# Patient Record
Sex: FEMALE | Race: WHITE | URBAN - METROPOLITAN AREA
[De-identification: names, ages, dates, MRNs, and addresses within clinical notes are randomized per-mention and may not be internally consistent; named-entity substitution may affect disease eponyms.]

---

## 2023-10-12 DIAGNOSIS — C43.59 MALIGNANT MELANOMA OF UPPER BACK (HCC): Primary | ICD-10-CM

## 2023-10-16 ENCOUNTER — TELEPHONE (OUTPATIENT)
Dept: HEMATOLOGY ONCOLOGY | Facility: CLINIC | Age: 53
End: 2023-10-16

## 2023-10-16 NOTE — TELEPHONE ENCOUNTER
I called Rinku Tamez in response to a referral that was received for patient to establish care with Surgical Oncology. Outreach was made to schedule a consultation. I left a voicemail explaining the reason for my call and advised patient to call [facility] at [phone]. Another attempt will be made to contact patient.

## 2023-10-18 ENCOUNTER — TELEPHONE (OUTPATIENT)
Dept: HEMATOLOGY ONCOLOGY | Facility: CLINIC | Age: 53
End: 2023-10-18

## 2023-10-18 NOTE — TELEPHONE ENCOUNTER
I called Estevan Andujar in response to a referral that was received for patient to establish care with Surgical Oncology. Outreach was made to schedule a consultation. I left a voicemail explaining the reason for my call and advised patient to call [facility] at [phone]. Another attempt will be made to contact patient.

## 2023-10-19 ENCOUNTER — TELEPHONE (OUTPATIENT)
Dept: HEMATOLOGY ONCOLOGY | Facility: CLINIC | Age: 53
End: 2023-10-19

## 2023-10-19 NOTE — TELEPHONE ENCOUNTER
I called Stephanie Ornelas in response to a referral that was received for patient to establish care with Surgical Oncology. Outreach was made to schedule a consultation. I left a voicemail explaining the reason for my call and advised patient to call [facility] at [phone]. This is the third attempt to schedule patient unsuccessfully. The referral has been closed, a [employer] message has been sent to patient (if applicable) and a letter has been sent to the address on file.

## 2023-10-31 ENCOUNTER — TELEPHONE (OUTPATIENT)
Dept: SURGICAL ONCOLOGY | Facility: CLINIC | Age: 53
End: 2023-10-31

## 2023-10-31 PROBLEM — C43.59 MALIGNANT MELANOMA OF TORSO EXCLUDING BREAST (HCC): Status: ACTIVE | Noted: 2023-10-31

## 2023-11-01 ENCOUNTER — DOCUMENTATION (OUTPATIENT)
Dept: HEMATOLOGY ONCOLOGY | Facility: CLINIC | Age: 53
End: 2023-11-01

## 2023-11-01 NOTE — PROGRESS NOTES
Pathology slides received: 11/01/23  From [facility] via [employer]. Consulting: Dr. Kar Mims  11/14/23  Dx:C43.59 (ICD-10-CM) - Malignant melanoma of upper back     Forwarded via interoffice mail to Saul Colón in Pathology department for processing.

## 2023-11-07 ENCOUNTER — LAB REQUISITION (OUTPATIENT)
Dept: LAB | Facility: HOSPITAL | Age: 53
End: 2023-11-07
Payer: COMMERCIAL

## 2023-11-07 DIAGNOSIS — C43.59 MALIGNANT MELANOMA OF OTHER PART OF TRUNK (HCC): ICD-10-CM

## 2023-11-07 PROCEDURE — 88321 CONSLTJ&REPRT SLD PREP ELSWR: CPT | Performed by: STUDENT IN AN ORGANIZED HEALTH CARE EDUCATION/TRAINING PROGRAM

## 2023-11-14 ENCOUNTER — CONSULT (OUTPATIENT)
Dept: SURGICAL ONCOLOGY | Facility: CLINIC | Age: 53
End: 2023-11-14
Payer: COMMERCIAL

## 2023-11-14 VITALS
SYSTOLIC BLOOD PRESSURE: 160 MMHG | HEIGHT: 62 IN | OXYGEN SATURATION: 99 % | TEMPERATURE: 99.2 F | HEART RATE: 86 BPM | DIASTOLIC BLOOD PRESSURE: 80 MMHG

## 2023-11-14 DIAGNOSIS — C43.59 MALIGNANT MELANOMA OF TORSO EXCLUDING BREAST (HCC): Primary | ICD-10-CM

## 2023-11-14 DIAGNOSIS — C43.59 MALIGNANT MELANOMA OF UPPER BACK (HCC): ICD-10-CM

## 2023-11-14 PROCEDURE — 99204 OFFICE O/P NEW MOD 45 MIN: CPT | Performed by: SURGERY

## 2023-11-14 RX ORDER — TRAMADOL HYDROCHLORIDE 50 MG/1
50 TABLET ORAL EVERY 6 HOURS PRN
Qty: 10 TABLET | Refills: 0 | Status: SHIPPED | OUTPATIENT
Start: 2023-11-14

## 2023-11-14 RX ORDER — SODIUM CAPRYLATE
POWDER (GRAM) MISCELLANEOUS
COMMUNITY

## 2023-11-14 RX ORDER — FERROUS SULFATE 325(65) MG
325 TABLET ORAL
COMMUNITY

## 2023-11-14 NOTE — H&P (VIEW-ONLY)
Surgical Oncology Consult       [address] [facility] [place]  [address] [facility] [address] [facility] [address] [phone]    Tarah Reid  1970  [id number]  [address] [facility] [place]  [address] [address] [phone]    Chief Complaint   Patient presents with    Consult       Assessment/Plan:    No problem-specific Assessment & Plan notes found for this encounter. Diagnoses and all orders for this visit:    Malignant melanoma of torso excluding breast ([address])  -     Case request operating room: [address] [place] [address]; Standing  -     EKG 12 lead; Future  -     XR chest pa & lateral; Future  -     traMADol (Ultram) 50 mg tablet; Take 1 tablet (50 mg total) by mouth every 6 (six) hours as needed for moderate pain  -     Case request operating room: WIDE EXCISION MELANOMA LEFT UPPER BACK    Malignant melanoma of upper back ([address])  -     Ambulatory Referral to Surgical Oncology  -     Case request operating room: [address] [place] [address]; Standing  -     Case request operating room: WIDE EXCISION MELANOMA LEFT UPPER BACK    Other orders  -     cyanocobalamin (VITAMIN B-12) 1000 MCG tablet; Take 1,000 mcg by mouth  -     ferrous sulfate 325 (65 Fe) mg tablet; Take 325 mg by mouth  -     Sodium Caprylate POWD; Take by mouth  -     Incentive spirometry; Standing  -     Insert and maintain IV line; Standing  -     Void On-Call to O.R.; Standing  -     Place sequential compression device; Standing      Advance Care Planning/Advance Directives:  Discussed disease status, cancer treatment plans and/or cancer treatment goals with the patient.      Oncology History   Malignant melanoma of torso excluding breast ([address])   9/27/2023 [facility]    [facility], [id number]  Left lateral upper back-melanoma, 0.35mm, no ulceration, mitosis 1  pT1a     11/7/2023 Biopsy    Consult case ( 3 slides [id number] [facility], collected 9/27/2023)   Skin, left lateral upper back:     MELANOMA (thickness: at least 0.5 mm); multifocally transected  Ulceration: not seen  Mitoses: 0/mm2  Pathologic stage: at least pT1a         History of Present Illness: Patient is a 80-year-old woman referred for stage I melanoma. She noticed this lesion on her left upper back/shoulder a year ago. At a recent dermatology visit, biopsy was done confirming a T1a melanoma. She is here for further management. She is fair skinned. She does not report excessive sunburn as a child. No personal or family history of melanoma. 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 She does follow regularly with dermatology. Review of Systems   Constitutional: Negative. HENT: Negative. Eyes: Negative. Respiratory: Negative. Cardiovascular: Negative. Gastrointestinal: Negative. Endocrine: Negative. Genitourinary: Negative. Musculoskeletal: Negative. Skin: Negative. Neurofibromas, new dx melanoma   Allergic/Immunologic: Negative. Neurological: Negative. Hematological: Negative. Psychiatric/Behavioral: Negative. All other systems reviewed and are negative. Patient Active Problem List   Diagnosis    Malignant melanoma of torso excluding breast ([address])     No past medical history on file. No past surgical history on file. No family history on file.   Social History     Socioeconomic History    Marital status: Single     Spouse name: Not on file    Number of children: Not on file    Years of education: Not on file    Highest education level: Not on file   Occupational History    Not on file   Tobacco Use    Smoking status: Former     Types: Cigarettes    Smokeless tobacco: Former   Substance and Sexual Activity    Alcohol use: Not on file    Drug use: Not on file    Sexual activity: Not on file   Other Topics Concern    Not on file   Social History Narrative    Not on file     Social Determinants of Health     Financial Resource Strain: Not on file   Food Insecurity: Not on file   Transportation Needs: Not on file   Physical Activity: Not on file   Stress: Not on file   Social Connections: Not on file   Intimate Partner Violence: Not on file   Housing Stability: Not on file       Current Outpatient Medications:     cyanocobalamin (VITAMIN B-12) 1000 MCG tablet, Take 1,000 mcg by mouth, Disp: , Rfl:     ferrous sulfate 325 (65 Fe) mg tablet, Take 325 mg by mouth, Disp: , Rfl:     Sodium Caprylate POWD, Take by mouth, Disp: , Rfl:     traMADol (Ultram) 50 mg tablet, Take 1 tablet (50 mg total) by mouth every 6 (six) hours as needed for moderate pain, Disp: 10 tablet, Rfl: 0  Allergies   Allergen Reactions    Bee Venom Swelling     Vitals:    11/14/23 1310   BP: 160/80   Pulse: 86   Temp: 99.2 °F (37.3 °C)   SpO2: 99%       Physical Exam  Vitals reviewed. Constitutional:       Appearance: Normal appearance. HENT:      Head: Normocephalic and atraumatic. Right Ear: External ear normal.      Left Ear: External ear normal.   Eyes:      Extraocular Movements: Extraocular movements intact. Pupils: Pupils are equal, round, and reactive to light. Cardiovascular:      Rate and Rhythm: Normal rate and regular rhythm. Pulses: Normal pulses. Heart sounds: Normal heart sounds. Pulmonary:      Breath sounds: Normal breath sounds. Abdominal:      General: Abdomen is flat. Palpations: Abdomen is soft. Musculoskeletal:         General: Normal range of motion. Cervical back: Normal range of motion and neck supple. Skin:     General: Skin is warm and dry. Comments: Multiple skin fibromas. 2.1 cm left upper back. shoulder melanoma, pigmented, irregular borders, multiple colors, no satellite lesions. No cervical or axillary adenopathy. Neurological:      General: No focal deficit present.       Mental Status: She is alert and oriented to person, place, and time. Psychiatric:         Mood and Affect: Mood normal.         Behavior: Behavior normal.         Thought Content: Thought content normal.         Judgment: Judgment normal.         Pathology:  Case Report   Surgical Pathology Report                         Case: [id number]                                   Authorizing Provider:  Ana Arriaza MD        Collected:           11/07/2023 0612               Ordering Location:     [facility]      Received:            11/07/2023 [facility]                                                                                  Laboratory                                                                   Pathologist:           Demetria Quinn MD                                                           Specimen:    Skin, Other, Right superior upper back, Left Lateral upper back (3 slides [id number]                [facility], collected 9/27/2023)                                                Final Diagnosis   Consult case ( 3 slides [id number] [facility], collected 9/27/2023)     A. Skin, right superior upper back:     NEUROFIBROMA. B. Skin, left lateral upper back:     MELANOMA (thickness: at least 0.5 mm); multifocally transected (see note). Note: The lesional cells are positive for the provided immunostain PRAME. Please see synoptic report for additional details.      Synoptic report for melanoma of the skin  Thickness: at least 0.5 mm (invasive melanoma extends to deep specimen margin multifocally)  Ulceration: not seen  Anatomic Denita Rang) level: at least IV  Type: superficial spreading melanoma  Mitoses: 0/mm2  Microsatellites: not seen  Lymphovascular invasion: not seen  Neurotropism: not seen  Tumor regression: not seen  Tumor-infiltrating lymphocytes (TIL): present, non-brisk  Margin assessment: invasive melanoma extends to deep and lateral specimen margins  Pathologic stage: at least pT1a  Associated nevus: not seen         Electronically signed by Jeannette Sandoval MD on 11/7/2023 at 12:50 PM       Labs:   CBC, Coags, BMP, Mg, Phos @[facility]@    Imaging  No results found. I reviewed the above laboratory and imaging data. Discussion/Summary: 30-year-old woman, T1a melanoma, left upper back. She is amenable to wide excision. Rationale for this along risk and benefit of surgical infection, bleeding, need for possible additional surgery, discussed. All questions answered consent signed at this visit.

## 2023-11-14 NOTE — PROGRESS NOTES
Surgical Oncology Consult       [address] [facility] [place]  [address] [facility] [address] [phone]    Lana Alberto  1970  [id number]  [address] [facility] [place]  [address] [facility] [address] [phone]    Chief Complaint   Patient presents with    Consult       Assessment/Plan:    No problem-specific Assessment & Plan notes found for this encounter. Diagnoses and all orders for this visit:    Malignant melanoma of torso excluding breast ([address])  -     Case request operating room: [address] [place] [address]; Standing  -     EKG 12 lead; Future  -     XR chest pa & lateral; Future  -     traMADol (Ultram) 50 mg tablet; Take 1 tablet (50 mg total) by mouth every 6 (six) hours as needed for moderate pain  -     Case request operating room: WIDE EXCISION MELANOMA LEFT UPPER BACK    Malignant melanoma of upper back ([address])  -     Ambulatory Referral to Surgical Oncology  -     Case request operating room: [address] [place] [address]; Standing  -     Case request operating room: WIDE EXCISION MELANOMA LEFT UPPER BACK    Other orders  -     cyanocobalamin (VITAMIN B-12) 1000 MCG tablet; Take 1,000 mcg by mouth  -     ferrous sulfate 325 (65 Fe) mg tablet; Take 325 mg by mouth  -     Sodium Caprylate POWD; Take by mouth  -     Incentive spirometry; Standing  -     Insert and maintain IV line; Standing  -     Void On-Call to O.R.; Standing  -     Place sequential compression device; Standing      Advance Care Planning/Advance Directives:  Discussed disease status, cancer treatment plans and/or cancer treatment goals with the patient.      Oncology History   Malignant melanoma of torso excluding breast ([address])   9/27/2023 Biopsy    [facility], [id number]  Left lateral upper back-melanoma, 0.35mm, no ulceration, mitosis 1  pT1a     11/7/2023 Biopsy    Consult case ( 3 slides [id number] [facility], collected 9/27/2023)   Skin, left lateral upper back:     MELANOMA (thickness: at least 0.5 mm); multifocally transected  Ulceration: not seen  Mitoses: 0/mm2  Pathologic stage: at least pT1a         History of Present Illness: Patient is a 55-year-old woman referred for stage I melanoma. She noticed this lesion on her left upper back/shoulder a year ago. At a recent dermatology visit, biopsy was done confirming a T1a melanoma. She is here for further management. She is fair skinned. She does not report excessive sunburn as a child. No personal or family history of melanoma. 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 She does follow regularly with dermatology. Review of Systems   Constitutional: Negative. HENT: Negative. Eyes: Negative. Respiratory: Negative. Cardiovascular: Negative. Gastrointestinal: Negative. Endocrine: Negative. Genitourinary: Negative. Musculoskeletal: Negative. Skin: Negative. Neurofibromas, new dx melanoma   Allergic/Immunologic: Negative. Neurological: Negative. Hematological: Negative. Psychiatric/Behavioral: Negative. All other systems reviewed and are negative. Patient Active Problem List   Diagnosis    Malignant melanoma of torso excluding breast ([address])     No past medical history on file. No past surgical history on file. No family history on file.   Social History     Socioeconomic History    Marital status: Single     Spouse name: Not on file    Number of children: Not on file    Years of education: Not on file    Highest education level: Not on file   Occupational History    Not on file   Tobacco Use    Smoking status: Former     Types: Cigarettes    Smokeless tobacco: Former   Substance and Sexual Activity    Alcohol use: Not on file    Drug use: Not on file    Sexual activity: Not on file   Other Topics Concern    Not on file   Social History Narrative    Not on file     Social Determinants of Health     Financial Resource Strain: Not on file   Food Insecurity: Not on file   Transportation Needs: Not on file   Physical Activity: Not on file   Stress: Not on file   Social Connections: Not on file   Intimate Partner Violence: Not on file   Housing Stability: Not on file       Current Outpatient Medications:     cyanocobalamin (VITAMIN B-12) 1000 MCG tablet, Take 1,000 mcg by mouth, Disp: , Rfl:     ferrous sulfate 325 (65 Fe) mg tablet, Take 325 mg by mouth, Disp: , Rfl:     Sodium Caprylate POWD, Take by mouth, Disp: , Rfl:     traMADol (Ultram) 50 mg tablet, Take 1 tablet (50 mg total) by mouth every 6 (six) hours as needed for moderate pain, Disp: 10 tablet, Rfl: 0  Allergies   Allergen Reactions    Bee Venom Swelling     Vitals:    11/14/23 1310   BP: 160/80   Pulse: 86   Temp: 99.2 °F (37.3 °C)   SpO2: 99%       Physical Exam  Vitals reviewed. Constitutional:       Appearance: Normal appearance. HENT:      Head: Normocephalic and atraumatic. Right Ear: External ear normal.      Left Ear: External ear normal.   Eyes:      Extraocular Movements: Extraocular movements intact. Pupils: Pupils are equal, round, and reactive to light. Cardiovascular:      Rate and Rhythm: Normal rate and regular rhythm. Pulses: Normal pulses. Heart sounds: Normal heart sounds. Pulmonary:      Breath sounds: Normal breath sounds. Abdominal:      General: Abdomen is flat. Palpations: Abdomen is soft. Musculoskeletal:         General: Normal range of motion. Cervical back: Normal range of motion and neck supple. Skin:     General: Skin is warm and dry. Comments: Multiple skin fibromas. 2.1 cm left upper back. shoulder melanoma, pigmented, irregular borders, multiple colors, no satellite lesions. No cervical or axillary adenopathy. Neurological:      General: No focal deficit present.       Mental Status: She is alert and oriented to person, place, and time. Psychiatric:         Mood and Affect: Mood normal.         Behavior: Behavior normal.         Thought Content: Thought content normal.         Judgment: Judgment normal.         Pathology:  Case Report   Surgical Pathology Report                         Case: [id number]                                   Authorizing Provider:  Marlene Webber MD        Collected:           11/07/2023 0612               Ordering Location:     [facility]      Received:            11/07/2023 [facility]                                                                                  Laboratory                                                                   Pathologist:           Lady Laughlin MD                                                           Specimen:    Skin, Other, Right superior upper back, Left Lateral upper back (3 slides [id number]                [facility], collected 9/27/2023)                                                Final Diagnosis   Consult case ( 3 slides [id number] [facility], collected 9/27/2023)     A. Skin, right superior upper back:     NEUROFIBROMA. B. Skin, left lateral upper back:     MELANOMA (thickness: at least 0.5 mm); multifocally transected (see note). Note: The lesional cells are positive for the provided immunostain PRAME. Please see synoptic report for additional details.      Synoptic report for melanoma of the skin  Thickness: at least 0.5 mm (invasive melanoma extends to deep specimen margin multifocally)  Ulceration: not seen  Anatomic Jana Rivas) level: at least IV  Type: superficial spreading melanoma  Mitoses: 0/mm2  Microsatellites: not seen  Lymphovascular invasion: not seen  Neurotropism: not seen  Tumor regression: not seen  Tumor-infiltrating lymphocytes (TIL): present, non-brisk  Margin assessment: invasive melanoma extends to deep and lateral specimen margins  Pathologic stage: at least pT1a  Associated nevus: not seen         Electronically signed by Caroline Rodriguez MD on 11/7/2023 at 12:50 PM       Labs:   CBC, Coags, BMP, Mg, Phos @[facility]@    Imaging  No results found. I reviewed the above laboratory and imaging data. Discussion/Summary: 59-year-old woman, T1a melanoma, left upper back. She is amenable to wide excision. Rationale for this along risk and benefit of surgical infection, bleeding, need for possible additional surgery, discussed. All questions answered consent signed at this visit.

## 2023-11-28 ENCOUNTER — TELEPHONE (OUTPATIENT)
Dept: SURGICAL ONCOLOGY | Facility: CLINIC | Age: 53
End: 2023-11-28

## 2023-11-28 NOTE — TELEPHONE ENCOUNTER
Spoke to patient and explained the need for the EKG and Chest x-ray. I gave her the weekend hours and phone number for the [facility] on [phone]. She agreed to go.

## 2023-11-30 ENCOUNTER — TELEPHONE (OUTPATIENT)
Dept: SURGICAL ONCOLOGY | Facility: CLINIC | Age: 53
End: 2023-11-30

## 2023-11-30 ENCOUNTER — PREP FOR PROCEDURE (OUTPATIENT)
Dept: SURGICAL ONCOLOGY | Facility: CLINIC | Age: 53
End: 2023-11-30

## 2023-11-30 RX ORDER — ERGOCALCIFEROL 1.25 MG/1
CAPSULE ORAL WEEKLY
COMMUNITY

## 2023-11-30 RX ORDER — VIT C/B6/B5/MAGNESIUM/HERB 173 50-5-6-5MG
CAPSULE ORAL DAILY
COMMUNITY

## 2023-11-30 NOTE — TELEPHONE ENCOUNTER
Spoke to patient again about the PAT EKG and chest x-ray. She will go on Friday, Dec 8 as she is off work that day. Patient was also made aware that she will need a ride home after surgery that day. She verbalized understanding and thanks.

## 2023-11-30 NOTE — PRE-PROCEDURE INSTRUCTIONS
Pre-Surgery Instructions:   Medication Instructions    cyanocobalamin (VITAMIN B-12) 1000 MCG tablet Stop taking 7 days prior to surgery. ferrous sulfate 325 (65 Fe) mg tablet Hold day of surgery. NON FORMULARY Hold day of surgery. Turmeric 500 MG CAPS Stop taking 7 days prior to surgery. Vitamin D, Ergocalciferol, 91382 units CAPS Stop taking 7 days prior to surgery. Medication instructions for day surgery reviewed. Please use only a sip of water to take your instructed medications. Avoid all over the counter vitamins, supplements and NSAIDS for one week prior to surgery per anesthesia guidelines. Tylenol is ok to take as needed. You will receive a call one business day prior to surgery with an arrival time and hospital directions. If your surgery is scheduled on a Monday, the hospital will be calling you on the Friday prior to your surgery. If you have not heard from anyone by 8pm, please call the hospital supervisor through the hospital  at [phone]. Paul Nelson [phone]). Do not eat or drink anything after midnight the night before your surgery, including candy, mints, lifesavers, or chewing gum. Do not drink alcohol 24hrs before your surgery. Try not to smoke at least 24hrs before your surgery. Follow the pre surgery showering instructions as listed in the [facility] Surgical Experience Booklet” or otherwise provided by your surgeon's office. Do not use a blade to shave the surgical area 1 week before surgery. It is okay to use a clean electric clippers up to 24 hours before surgery. Do not apply any lotions, creams, including makeup, cologne, deodorant, or perfumes after showering on the day of your surgery. Do not use dry shampoo, hair spray, hair gel, or any type of hair products. No contact lenses, eye make-up, or artificial eyelashes. Remove nail polish, including gel polish, and any artificial, gel, or acrylic nails if possible.  Remove all jewelry including rings and body piercing jewelry. Wear causal clothing that is easy to take on and off. Consider your type of surgery. Keep any valuables, jewelry, piercings at home. Please bring any specially ordered equipment (sling, braces) if indicated. Arrange for a responsible person to drive you to and from the hospital on the day of your surgery. Visitor Guidelines discussed. Call the surgeon's office with any new illnesses, exposures, or additional questions prior to surgery. Please reference your [facility] Surgical Experience Booklet” for additional information to prepare for your upcoming surgery.

## 2023-12-07 ENCOUNTER — ANESTHESIA EVENT (OUTPATIENT)
Dept: PERIOP | Facility: HOSPITAL | Age: 53
End: 2023-12-07
Payer: COMMERCIAL

## 2023-12-08 ENCOUNTER — HOSPITAL ENCOUNTER (OUTPATIENT)
Dept: RADIOLOGY | Facility: HOSPITAL | Age: 53
Discharge: HOME | End: 2023-12-08
Payer: COMMERCIAL

## 2023-12-08 ENCOUNTER — APPOINTMENT (OUTPATIENT)
Dept: LAB | Facility: HOSPITAL | Age: 53
End: 2023-12-08
Payer: COMMERCIAL

## 2023-12-08 DIAGNOSIS — C43.59 MALIGNANT MELANOMA OF TORSO EXCLUDING BREAST (HCC): ICD-10-CM

## 2023-12-08 LAB
ATRIAL RATE: 86 BPM
P AXIS: 41 DEGREES
PR INTERVAL: 138 MS
QRS AXIS: 9 DEGREES
QRSD INTERVAL: 80 MS
QT INTERVAL: 360 MS
QTC INTERVAL: 430 MS
T WAVE AXIS: 55 DEGREES
VENTRICULAR RATE: 86 BPM

## 2023-12-08 PROCEDURE — 71046 X-RAY EXAM CHEST 2 VIEWS: CPT

## 2023-12-11 ENCOUNTER — HOSPITAL ENCOUNTER (OUTPATIENT)
Facility: HOSPITAL | Age: 53
Setting detail: OUTPATIENT SURGERY
Discharge: HOME | End: 2023-12-11
Attending: SURGERY | Admitting: SURGERY
Payer: COMMERCIAL

## 2023-12-11 ENCOUNTER — ANESTHESIA (OUTPATIENT)
Dept: PERIOP | Facility: HOSPITAL | Age: 53
End: 2023-12-11
Payer: COMMERCIAL

## 2023-12-11 VITALS
OXYGEN SATURATION: 92 % | HEART RATE: 88 BPM | DIASTOLIC BLOOD PRESSURE: 89 MMHG | SYSTOLIC BLOOD PRESSURE: 161 MMHG | TEMPERATURE: 97.9 F | WEIGHT: 167.55 LBS | BODY MASS INDEX: 30.83 KG/M2 | RESPIRATION RATE: 18 BRPM | HEIGHT: 62 IN

## 2023-12-11 DIAGNOSIS — C43.59 MALIGNANT MELANOMA OF TORSO EXCLUDING BREAST (HCC): ICD-10-CM

## 2023-12-11 DIAGNOSIS — C43.59 MALIGNANT MELANOMA OF UPPER BACK (HCC): ICD-10-CM

## 2023-12-11 PROBLEM — Q85.00 NEUROFIBROMATOSIS (HCC): Status: ACTIVE | Noted: 2023-12-11

## 2023-12-11 LAB
EXT PREGNANCY TEST URINE: NEGATIVE
EXT. CONTROL: NORMAL

## 2023-12-11 PROCEDURE — 81025 URINE PREGNANCY TEST: CPT | Performed by: SURGERY

## 2023-12-11 PROCEDURE — 88307 TISSUE EXAM BY PATHOLOGIST: CPT | Performed by: STUDENT IN AN ORGANIZED HEALTH CARE EDUCATION/TRAINING PROGRAM

## 2023-12-11 PROCEDURE — 11606 EXC TR-EXT MAL+MARG >4 CM: CPT | Performed by: SURGERY

## 2023-12-11 PROCEDURE — 12034 INTMD RPR S/TR/EXT 7.6-12.5: CPT | Performed by: SURGERY

## 2023-12-11 RX ORDER — FENTANYL CITRATE/PF 50 MCG/ML
50 SYRINGE (ML) INJECTION
Status: DISCONTINUED | OUTPATIENT
Start: 2023-12-11 | End: 2023-12-11 | Stop reason: HOSPADM

## 2023-12-11 RX ORDER — ONDANSETRON 2 MG/ML
INJECTION INTRAMUSCULAR; INTRAVENOUS AS NEEDED
Status: DISCONTINUED | OUTPATIENT
Start: 2023-12-11 | End: 2023-12-11

## 2023-12-11 RX ORDER — LIDOCAINE HYDROCHLORIDE 20 MG/ML
INJECTION, SOLUTION EPIDURAL; INFILTRATION; INTRACAUDAL; PERINEURAL AS NEEDED
Status: DISCONTINUED | OUTPATIENT
Start: 2023-12-11 | End: 2023-12-11

## 2023-12-11 RX ORDER — MAGNESIUM HYDROXIDE 1200 MG/15ML
LIQUID ORAL AS NEEDED
Status: DISCONTINUED | OUTPATIENT
Start: 2023-12-11 | End: 2023-12-11 | Stop reason: HOSPADM

## 2023-12-11 RX ORDER — PROPOFOL 10 MG/ML
INJECTION, EMULSION INTRAVENOUS AS NEEDED
Status: DISCONTINUED | OUTPATIENT
Start: 2023-12-11 | End: 2023-12-11

## 2023-12-11 RX ORDER — ONDANSETRON 2 MG/ML
4 INJECTION INTRAMUSCULAR; INTRAVENOUS ONCE AS NEEDED
Status: DISCONTINUED | OUTPATIENT
Start: 2023-12-11 | End: 2023-12-11 | Stop reason: HOSPADM

## 2023-12-11 RX ORDER — DEXAMETHASONE SODIUM PHOSPHATE 10 MG/ML
INJECTION, SOLUTION INTRAMUSCULAR; INTRAVENOUS AS NEEDED
Status: DISCONTINUED | OUTPATIENT
Start: 2023-12-11 | End: 2023-12-11

## 2023-12-11 RX ORDER — MIDAZOLAM HYDROCHLORIDE 2 MG/2ML
INJECTION, SOLUTION INTRAMUSCULAR; INTRAVENOUS AS NEEDED
Status: DISCONTINUED | OUTPATIENT
Start: 2023-12-11 | End: 2023-12-11

## 2023-12-11 RX ORDER — FENTANYL CITRATE 50 UG/ML
INJECTION, SOLUTION INTRAMUSCULAR; INTRAVENOUS AS NEEDED
Status: DISCONTINUED | OUTPATIENT
Start: 2023-12-11 | End: 2023-12-11

## 2023-12-11 RX ORDER — SODIUM CHLORIDE 9 MG/ML
125 INJECTION, SOLUTION INTRAVENOUS CONTINUOUS
Status: DISCONTINUED | OUTPATIENT
Start: 2023-12-11 | End: 2023-12-11 | Stop reason: HOSPADM

## 2023-12-11 RX ADMIN — PROPOFOL 200 MG: 10 INJECTION, EMULSION INTRAVENOUS at 13:14

## 2023-12-11 RX ADMIN — FENTANYL CITRATE 25 MCG: 50 INJECTION INTRAMUSCULAR; INTRAVENOUS at 13:22

## 2023-12-11 RX ADMIN — FENTANYL CITRATE 25 MCG: 50 INJECTION INTRAMUSCULAR; INTRAVENOUS at 13:36

## 2023-12-11 RX ADMIN — LIDOCAINE HYDROCHLORIDE 100 MG: 20 INJECTION, SOLUTION EPIDURAL; INFILTRATION; INTRACAUDAL at 13:14

## 2023-12-11 RX ADMIN — SODIUM CHLORIDE 125 ML/HR: 0.9 INJECTION, SOLUTION INTRAVENOUS at 11:57

## 2023-12-11 RX ADMIN — DEXAMETHASONE SODIUM PHOSPHATE 5 MG: 10 INJECTION INTRAMUSCULAR; INTRAVENOUS at 13:17

## 2023-12-11 RX ADMIN — FENTANYL CITRATE 25 MCG: 50 INJECTION INTRAMUSCULAR; INTRAVENOUS at 13:30

## 2023-12-11 RX ADMIN — MIDAZOLAM 2 MG: 1 INJECTION INTRAMUSCULAR; INTRAVENOUS at 13:10

## 2023-12-11 RX ADMIN — ONDANSETRON 4 MG: 2 INJECTION INTRAMUSCULAR; INTRAVENOUS at 13:25

## 2023-12-11 RX ADMIN — FENTANYL CITRATE 25 MCG: 50 INJECTION INTRAMUSCULAR; INTRAVENOUS at 13:19

## 2023-12-11 NOTE — DISCHARGE INSTR - AVS FIRST PAGE
[facility] - CLOSED Surgical Discharge Instructions  Procedure: Melanoma Wide Excision  Surgeon: Dr. Ingrid Chavez    Please follow-up as scheduled. Activity:  - Regular activity (working around the house, walking up/down stairs, etc.) is ok and encouraged. - No driving until you are no longer using narcotic pain medications. Diet:    - You may resume your normal diet. Wound Care:  - You may remove your dressing and shower 48hrs after your procedure. - When you shower, allow soapy water to run over your incisions and then pat dry. Do NOT rub or scrub your incision.  - Do not submerge your incisions in tubs, baths, pools, etc until cleared by your surgeon.  - Do not apply any lotions, creams, or ointments to your incision until cleared by your surgeon.  - Nedra Loganartur may use daily dressing changes as needed (to prevent catching on your clothing or to absorb any drainage) with a dry gauze dressing held in place with a small piece of tape. Medications:    - Continue your pre-hospital medication regimen after discharge unless your were instructed otherwise. Please refer to your discharge medication list for further details. - For the first few days following your procedure, take Tylenol to provide a solid baseline pain control and use the stronger pain medications (if provided) for more severe pain. - Pain medications can cause constipation. If you are unable to have a bowel movement for more than 1-2 days, take an over the counter stool softener or laxative such as Colace or Senna. Additional Instructions:  - If you have any questions or concerns after discharge please do not hesitate to contact our office, we would be happy to provide clarification.     Call our office or return to the Emergency Room if you develop a fever greater than 101F, chills, persistent nausea/vomiting, worsening/uncontrollable pain, and/or increasing redness or purulent/foul smelling drainage from your incision(s)

## 2023-12-11 NOTE — OP NOTE
OPERATIVE REPORT  PATIENT NAME: Imtiaz Hanley    :  1970  MRN: [id number]  Pt Location: [facility] OR ROOM 05    SURGERY DATE: 2023    Surgeon(s) and Role:     * Edita Gomez MD - Primary     * Jovanna Mojica MD - Assisting    Preop Diagnosis:  Malignant melanoma of torso excluding breast ([address]) [C43.59]  Malignant melanoma of upper back ([address]) [C43.59]    Post-Op Diagnosis Codes:     * Malignant melanoma of torso excluding breast ([address]) [C43.59]     * Malignant melanoma of upper back ([address]) [C43.59]    Procedure(s):  Left - WIDE EXCISION LEFT SHOULDER/ BACK MELANOMA    Specimen(s):  ID Type Source Tests Collected by Time Destination   1 : Left shoulder melanoma Tissue Soft Tissue, Other TISSUE EXAM Edita Gomez MD 2023 1349        Estimated Blood Loss:   10 mL    Drains:  * No LDAs found *    Anesthesia Type:   IV Sedation with Anesthesia    Operative Indications:  Malignant melanoma of torso excluding breast ([address]) [C43.59]  Malignant melanoma of upper back ([address]) [C43.59]      Operative Findings:  Left shoulder melanoma measuring 0.5 mm in depth, approximately 1.2 cm in greatest diameter. Resulting left shoulder defect measuring 9.5 x 3.5 x 1.0 cm post excision of melanoma. Complications:   None    Procedure and Technique:  Patient is a 30-year-old woman found to have a 0.5 mm deep melanoma of the left posterior shoulder. She comes in for wide excision. She was brought to the operating room and identified by proper timeout. Following issues intubated by anesthesia team.  She was then prepped and draped her left shoulder exposed. Following this 1 cm margins were drawn around the visible lesion. The lines were then anesthetized and incised sharply. Cautery was used to obtain a full-thickness excision down to muscle fascia. Specimen was oriented with sutures and clips in the usual fashion. Resulting defect measured 9.5 x 3.5 x 1.0 cm in size.   Wound edges were then undermined to allow for incision closure. We then used 2-0 Vicryl to close the deep dermal layers followed by 3-0 Vicryl to close the superficial dermal layers followed by running 3-0 nylon suture to close the skin. Antibiotic cream was applied followed by gauze and Tegaderm. Patient tolerated procedure well. I was present for the entire procedure.     Patient Disposition:  PACU     Wide Local Excision for Primary Cutaneous Melanoma - Excision 1 (Shoulder - Left)  Operation performed with curative intent Yes   Original Breslow thickness of the lesion 0.5 mm (to the tenth of a millimeter)   Clinical margin width   (measured from the edge of the lesion or the prior excision scar) 1 cm   Depth of excision Full-thickness skin/subcutaneous tissue down to fascia (melanoma)         SIGNATURE: Parker Valdez MD  DATE: December 11, 2023  TIME: 2:49 PM

## 2023-12-11 NOTE — ANESTHESIA POSTPROCEDURE EVALUATION
Post-Op Assessment Note    CV Status:  Stable    Pain management: adequate       Mental Status:  Alert and awake   Hydration Status:  Euvolemic   PONV Controlled:  Controlled   Airway Patency:  Patent     Post Op Vitals Reviewed: Yes      Staff: Anesthesiologist               BP      Temp      Pulse     Resp      SpO2      /89   Pulse 88   Temp 97.9 °F (36.6 °C) (Temporal)   Resp 18   Ht 5' 2" (1.575 m)   Wt 76 kg (167 lb 8.8 oz)   LMP 08/23/2022 (Approximate)   SpO2 92%   Breastfeeding No   BMI 30.65 kg/m²

## 2023-12-11 NOTE — ANESTHESIA PREPROCEDURE EVALUATION
Procedure:  WIDE EXCISION LEFT SHOULDER/ BACK MELANOMA (Left: Back)    Relevant Problems   Nervous and Auditory   (+) Neurofibromatosis (HCC)      Other   (+) Malignant melanoma of torso excluding breast (HCC)        Physical Exam    Airway    Mallampati score: III  TM Distance: >3 FB  Neck ROM: full     Dental   Comment: No upper teeth     Cardiovascular  Rhythm: regular, Rate: normal, Cardiovascular exam normal    Pulmonary  Pulmonary exam normal Breath sounds clear to auscultation    Other Findings  post-pubertal.      Anesthesia Plan  ASA Score- 2     Anesthesia Type- general with ASA Monitors. Additional Monitors:     Airway Plan: LMA. Plan Factors-    Chart reviewed. Patient summary reviewed. Induction-     Postoperative Plan-     Informed Consent- Anesthetic plan and risks discussed with patient.

## 2023-12-11 NOTE — INTERVAL H&P NOTE
H&P reviewed. After examining the patient I find no changes in the patients condition since the H&P had been written.     Vitals:    12/11/23 1210   BP: (!) 187/96   Pulse:    Resp:    Temp:    SpO2:

## 2023-12-29 ENCOUNTER — OFFICE VISIT (OUTPATIENT)
Dept: SURGICAL ONCOLOGY | Facility: CLINIC | Age: 53
End: 2023-12-29

## 2023-12-29 VITALS
OXYGEN SATURATION: 98 % | TEMPERATURE: 98.8 F | HEIGHT: 62 IN | WEIGHT: 168 LBS | SYSTOLIC BLOOD PRESSURE: 132 MMHG | HEART RATE: 69 BPM | BODY MASS INDEX: 30.91 KG/M2 | RESPIRATION RATE: 18 BRPM | DIASTOLIC BLOOD PRESSURE: 70 MMHG

## 2023-12-29 DIAGNOSIS — C43.59 MALIGNANT MELANOMA OF TORSO EXCLUDING BREAST (HCC): Primary | ICD-10-CM

## 2023-12-29 PROCEDURE — 99024 POSTOP FOLLOW-UP VISIT: CPT | Performed by: SURGERY

## 2023-12-29 NOTE — LETTER
December 29, 2023     ASHVIN Chandra  [address]  [address]  [place] [address]    Patient: Chantale Mensah   YOB: 1970   Date of Visit: 12/29/2023       Dear Dr. Barrow:    Thank you for referring Chantale Mensah to me for evaluation. Below are my notes for this consultation.    If you have questions, please do not hesitate to call me. I look forward to following your patient along with you.         Sincerely,        Shaun Alcaraz MD        CC: No Recipients    Shaun Alcaraz MD  12/29/2023 11:18 AM  Sign when Signing Visit     Surgical Oncology Follow Up       [address] [facility] SURGICAL ONCOLOGY [place]  [address] [facility] [address]    Chantale Mensah  1970  [id number]  [address] [facility] SURGICAL ONCOLOGY [place]  [address] [facility] [address]    Chief Complaint   Patient presents with   • Post-op       Assessment/Plan:    No problem-specific Assessment & Plan notes found for this encounter.       Diagnoses and all orders for this visit:    Malignant melanoma of torso excluding breast (HCC)      Advance Care Planning/Advance Directives:  Discussed disease status, cancer treatment plans and/or cancer treatment goals with the patient.     Oncology History   Malignant melanoma of torso excluding breast (HCC)   9/27/2023 Biopsy    [facility], [id number]  Left lateral upper back-melanoma, 0.35mm, no ulceration, mitosis 1  pT1a     11/7/2023 Biopsy    Consult case ( 3 slides [id number] [facility], collected 9/27/2023)   Skin, left lateral upper back:     MELANOMA (thickness: at least 0.5 mm); multifocally transected  Ulceration: not seen  Mitoses: 0/mm2  Pathologic stage: at least pT1a     12/11/2023 Surgery    Skin, left shoulder, excision:     -Residual MELANOMA (thickness: 1.1 mm); not seen at examined inked specimen margins         History of Present Illness: Patient is a  50-year-old woman here for postop access with excision of melanoma from her left shoulder.  -Interval History: No major issues or complaints since procedure.    Review of Systems:  Review of Systems   Constitutional: Negative.    HENT: Negative.     Eyes: Negative.    Respiratory: Negative.     Cardiovascular: Negative.    Gastrointestinal: Negative.    Endocrine: Negative.    Genitourinary: Negative.    Musculoskeletal: Negative.    Skin:  Positive for wound.   Allergic/Immunologic: Negative.    Neurological: Negative.    Hematological: Negative.    Psychiatric/Behavioral: Negative.     All other systems reviewed and are negative.      Patient Active Problem List   Diagnosis   • Malignant melanoma of torso excluding breast (HCC)   • Neurofibromatosis (HCC)     Past Medical History:   Diagnosis Date   • Anemia    • Depression    • Melanoma (HCC)     left shoulder / left back   • Wears dentures     full uppers     Past Surgical History:   Procedure Laterality Date   • COLONOSCOPY     • EGD     • INDUCED      • SKIN LESION EXCISION Left 2023    Procedure: WIDE EXCISION LEFT SHOULDER/ BACK MELANOMA;  Surgeon: Shaun Alcaraz MD;  Location: [facility] OR;  Service: Surgical Oncology     No family history on file.  Social History     Socioeconomic History   • Marital status: Single     Spouse name: Not on file   • Number of children: Not on file   • Years of education: Not on file   • Highest education level: Not on file   Occupational History   • Not on file   Tobacco Use   • Smoking status: Former     Current packs/day: 0.00     Types: Cigarettes     Quit date:      Years since quittin.0     Passive exposure: Past   • Smokeless tobacco: Never   Substance and Sexual Activity   • Alcohol use: Yes     Comment: 2 drinks per day   • Drug use: Not Currently   • Sexual activity: Not on file   Other Topics Concern   • Not on file   Social History Narrative   • Not on file     Social Determinants of Health      Financial Resource Strain: Not on file   Food Insecurity: Not on file   Transportation Needs: Not on file   Physical Activity: Not on file   Stress: Not on file   Social Connections: Not on file   Intimate Partner Violence: Not on file   Housing Stability: Not on file       Current Outpatient Medications:   •  cyanocobalamin (VITAMIN B-12) 1000 MCG tablet, Take 1,000 mcg by mouth daily, Disp: , Rfl:   •  ferrous sulfate 325 (65 Fe) mg tablet, Take 325 mg by mouth Every other day, Disp: , Rfl:   •  NON FORMULARY, Medical marijuana daily, Disp: , Rfl:   •  traMADol (Ultram) 50 mg tablet, Take 1 tablet (50 mg total) by mouth every 6 (six) hours as needed for moderate pain, Disp: 10 tablet, Rfl: 0  •  Turmeric 500 MG CAPS, Take by mouth daily, Disp: , Rfl:   •  Vitamin D, Ergocalciferol, 81733 units CAPS, Take by mouth once a week wednesdays, Disp: , Rfl:   Allergies   Allergen Reactions   • Bee Venom Swelling     Vitals:    12/29/23 1058   BP: 132/70   Pulse: 69   Resp: 18   Temp: 98.8 °F (37.1 °C)   SpO2: 98%       Physical Exam  Vitals reviewed.   Skin:     Comments: Left shoulder excision site c/d/i           Results:  Labs:    Case Report   Surgical Pathology Report                         Case: [id number]                                   Authorizing Provider:  Shaun Alcaraz MD        Collected:           12/11/2023 1349               Ordering Location:     [facility]        Received:            12/11/2023 [address] [place] [address] Operating Room                                                     Pathologist:           Tucker Gibson MD                                                           Specimen:    Skin, Other, Left shoulder melanoma                                                        Final Diagnosis   A. Skin, left shoulder, excision:     -Residual MELANOMA (thickness: 1.1 mm); not seen at examined inked specimen margins (please see synoptic report for  additional details).      -Incidental numerous neurofibromas, including intraneural neurofibromas; some neurofibromas extend to peripheral specimen margins.     -Incidental cavernous hemangioma; not seen at examined inked specimen margins.     Synoptic report for melanoma of the skin  Thickness: 1.1 mm  Ulceration: not seen  Anatomic (Brent) level: IV  Type: superficial spreading melanoma  Mitoses: 0/mm2  Microsatellites: not seen  Lymphovascular invasion: not seen  Neurotropism: not seen  Tumor regression: not seen  Tumor-infiltrating lymphocytes (TIL): present, non-brisk  Margin assessment: not seen at examined inked specimen margins  Pathologic stage: pT2a  Associated nevus: not seen         Electronically signed by Tucker Gibson MD on 12/16/2023 at  4:06 PM     Case Report   Surgical Pathology Report                         Case: [id number]                                   Authorizing Provider:  Shaun Alcaraz MD        Collected:           11/07/2023 0612               Ordering Location:     [facility]      Received:            11/07/2023 0613                                      Hospital Specialty                                                                                  Laboratory                                                                   Pathologist:           Tucker Gibson MD                                                           Specimen:    Skin, Other, Right superior upper back, Left Lateral upper back (3 slides [id number]                [facility], collected 9/27/2023)                                                Final Diagnosis   Consult case ( 3 slides [id number] [facility], collected 9/27/2023)     A. Skin, right superior upper back:     NEUROFIBROMA.              B. Skin, left lateral upper back:     MELANOMA (thickness: at least 0.5 mm); multifocally transected (see note).     Note: The lesional cells are positive for the provided immunostain  XIANG. Please see synoptic report for additional details.     Synoptic report for melanoma of the skin  Thickness: at least 0.5 mm (invasive melanoma extends to deep specimen margin multifocally)  Ulceration: not seen  Anatomic (Brent) level: at least IV  Type: superficial spreading melanoma  Mitoses: 0/mm2  Microsatellites: not seen  Lymphovascular invasion: not seen  Neurotropism: not seen  Tumor regression: not seen  Tumor-infiltrating lymphocytes (TIL): present, non-brisk  Margin assessment: invasive melanoma extends to deep and lateral specimen margins  Pathologic stage: at least pT1a  Associated nevus: not seen         Electronically signed by Tucker Gibson MD on 11/7/2023 at 12:50 PM     Imaging  XR chest pa & lateral    Result Date: 12/8/2023  Narrative: CHEST INDICATION:   Malignant melanoma of other part of trunk. COMPARISON:  There are no previous examinations available for comparison. EXAM PERFORMED/VIEWS:  XR CHEST PA & LATERAL FINDINGS: Cardiomediastinal silhouette appears unremarkable. The lungs are clear.  No pneumothorax or pleural effusion. Osseous structures appear within normal limits for patient age. Nodules project over the right upper quadrant of the abdomen, likely subcutaneous. Recommend clinical correlation.     Impression: 1. No acute cardiopulmonary disease. 2. Nodules project over the right upper quadrant of the abdomen, likely subcutaneous. Recommend clinical correlation. Electronically signed: 12/08/2023 11:18 AM Nicole Rosales MD    I reviewed the above laboratory and imaging data.    Discussion/Summary: 53-year-old woman, with initially a 0.5 mm deep melanoma, found to be 1.1 mm deep on wide formal excision.  No satellitosis, no mitoses, no ulceration.  Would consider sentinel biopsy, though would utilize decision DX testing to determine risk of metastasis.  If low, then surveillance with ultrasound may be an option and second operation may be noted.

## 2023-12-29 NOTE — PROGRESS NOTES
Surgical Oncology Follow Up       [address]  [facility] SURGICAL ONCOLOGY [place]  [address]  [facility] [address]    Chantale Mensah  1970  [id number]  [address] [facility] SURGICAL ONCOLOGY [place]  [address]  [facility] [address]    Chief Complaint   Patient presents with    Post-op       Assessment/Plan:    No problem-specific Assessment & Plan notes found for this encounter.       Diagnoses and all orders for this visit:    Malignant melanoma of torso excluding breast (HCC)      Advance Care Planning/Advance Directives:  Discussed disease status, cancer treatment plans and/or cancer treatment goals with the patient.     Oncology History   Malignant melanoma of torso excluding breast (HCC)   9/27/2023 Biopsy    [place] [facility], [id number]  Left lateral upper back-melanoma, 0.35mm, no ulceration, mitosis 1  pT1a     11/7/2023 Biopsy    Consult case ( 3 slides [id number] [facility], collected 9/27/2023)   Skin, left lateral upper back:     MELANOMA (thickness: at least 0.5 mm); multifocally transected  Ulceration: not seen  Mitoses: 0/mm2  Pathologic stage: at least pT1a     12/11/2023 Surgery    Skin, left shoulder, excision:     -Residual MELANOMA (thickness: 1.1 mm); not seen at examined inked specimen margins         History of Present Illness: Patient is a 50-year-old woman here for postop access with excision of melanoma from her left shoulder.  -Interval History: No major issues or complaints since procedure.    Review of Systems:  Review of Systems   Constitutional: Negative.    HENT: Negative.     Eyes: Negative.    Respiratory: Negative.     Cardiovascular: Negative.    Gastrointestinal: Negative.    Endocrine: Negative.    Genitourinary: Negative.    Musculoskeletal: Negative.    Skin:  Positive for wound.   Allergic/Immunologic: Negative.    Neurological: Negative.    Hematological: Negative.     Psychiatric/Behavioral: Negative.     All other systems reviewed and are negative.      Patient Active Problem List   Diagnosis    Malignant melanoma of torso excluding breast (HCC)    Neurofibromatosis (HCC)     Past Medical History:   Diagnosis Date    Anemia     Depression     Melanoma (HCC)     left shoulder / left back    Wears dentures     full uppers     Past Surgical History:   Procedure Laterality Date    COLONOSCOPY      EGD      INDUCED       SKIN LESION EXCISION Left 2023    Procedure: WIDE EXCISION LEFT SHOULDER/ BACK MELANOMA;  Surgeon: Shaun Alcaraz MD;  Location: [facility] OR;  Service: Surgical Oncology     No family history on file.  Social History     Socioeconomic History    Marital status: Single     Spouse name: Not on file    Number of children: Not on file    Years of education: Not on file    Highest education level: Not on file   Occupational History    Not on file   Tobacco Use    Smoking status: Former     Current packs/day: 0.00     Types: Cigarettes     Quit date:      Years since quittin.0     Passive exposure: Past    Smokeless tobacco: Never   Substance and Sexual Activity    Alcohol use: Yes     Comment: 2 drinks per day    Drug use: Not Currently    Sexual activity: Not on file   Other Topics Concern    Not on file   Social History Narrative    Not on file     Social Determinants of Health     Financial Resource Strain: Not on file   Food Insecurity: Not on file   Transportation Needs: Not on file   Physical Activity: Not on file   Stress: Not on file   Social Connections: Not on file   Intimate Partner Violence: Not on file   Housing Stability: Not on file       Current Outpatient Medications:     cyanocobalamin (VITAMIN B-12) 1000 MCG tablet, Take 1,000 mcg by mouth daily, Disp: , Rfl:     ferrous sulfate 325 (65 Fe) mg tablet, Take 325 mg by mouth Every other day, Disp: , Rfl:     NON FORMULARY, Medical marijuana daily, Disp: , Rfl:     traMADol  (Ultram) 50 mg tablet, Take 1 tablet (50 mg total) by mouth every 6 (six) hours as needed for moderate pain, Disp: 10 tablet, Rfl: 0    Turmeric 500 MG CAPS, Take by mouth daily, Disp: , Rfl:     Vitamin D, Ergocalciferol, 75855 units CAPS, Take by mouth once a week wednesdays, Disp: , Rfl:   Allergies   Allergen Reactions    Bee Venom Swelling     Vitals:    12/29/23 1058   BP: 132/70   Pulse: 69   Resp: 18   Temp: 98.8 °F (37.1 °C)   SpO2: 98%       Physical Exam  Vitals reviewed.   Skin:     Comments: Left shoulder excision site c/d/i           Results:  Labs:    Case Report   Surgical Pathology Report                         Case: [id number]                                   Authorizing Provider:  Shaun Alcaraz MD        Collected:           12/11/2023 1349               Ordering Location:     [facility]        Received:            12/11/2023 [address] [place] [address] Operating Room                                                     Pathologist:           Tucker Gibson MD                                                           Specimen:    Skin, Other, Left shoulder melanoma                                                        Final Diagnosis   A. Skin, left shoulder, excision:     -Residual MELANOMA (thickness: 1.1 mm); not seen at examined inked specimen margins (please see synoptic report for additional details).      -Incidental numerous neurofibromas, including intraneural neurofibromas; some neurofibromas extend to peripheral specimen margins.     -Incidental cavernous hemangioma; not seen at examined inked specimen margins.     Synoptic report for melanoma of the skin  Thickness: 1.1 mm  Ulceration: not seen  Anatomic (Brent) level: IV  Type: superficial spreading melanoma  Mitoses: 0/mm2  Microsatellites: not seen  Lymphovascular invasion: not seen  Neurotropism: not seen  Tumor regression: not seen  Tumor-infiltrating lymphocytes (TIL): present,  non-brisk  Margin assessment: not seen at examined inked specimen margins  Pathologic stage: pT2a  Associated nevus: not seen         Electronically signed by Tucker Gibson MD on 12/16/2023 at  4:06 PM     Case Report   Surgical Pathology Report                         Case: [id number]                                   Authorizing Provider:  Shaun Alcaraz MD        Collected:           11/07/2023 0612               Ordering Location[facility]     [facility]      Received:            11/07/2023 0613                                      Hospital Specialty                                                                                  Laboratory                                                                   Pathologist:           Tucker Gibson MD                                                           Specimen:    Skin, Other, Right superior upper back, Left Lateral upper back (3 slides [id number]                [facility], collected 9/27/2023)                                                Final Diagnosis   Consult case ( 3 slides [id number] [facility], collected 9/27/2023)     A. Skin, right superior upper back:     NEUROFIBROMA.              B. Skin, left lateral upper back:     MELANOMA (thickness: at least 0.5 mm); multifocally transected (see note).     Note: The lesional cells are positive for the provided immunostain PRAME. Please see synoptic report for additional details.     Synoptic report for melanoma of the skin  Thickness: at least 0.5 mm (invasive melanoma extends to deep specimen margin multifocally)  Ulceration: not seen  Anatomic (Brent) level: at least IV  Type: superficial spreading melanoma  Mitoses: 0/mm2  Microsatellites: not seen  Lymphovascular invasion: not seen  Neurotropism: not seen  Tumor regression: not seen  Tumor-infiltrating lymphocytes (TIL): present, non-brisk  Margin assessment: invasive melanoma extends to deep and lateral specimen  margins  Pathologic stage: at least pT1a  Associated nevus: not seen         Electronically signed by Tucker Gibson MD on 11/7/2023 at 12:50 PM     Imaging  XR chest pa & lateral    Result Date: 12/8/2023  Narrative: CHEST INDICATION:   Malignant melanoma of other part of trunk. COMPARISON:  There are no previous examinations available for comparison. EXAM PERFORMED/VIEWS:  XR CHEST PA & LATERAL FINDINGS: Cardiomediastinal silhouette appears unremarkable. The lungs are clear.  No pneumothorax or pleural effusion. Osseous structures appear within normal limits for patient age. Nodules project over the right upper quadrant of the abdomen, likely subcutaneous. Recommend clinical correlation.     Impression: 1. No acute cardiopulmonary disease. 2. Nodules project over the right upper quadrant of the abdomen, likely subcutaneous. Recommend clinical correlation. Electronically signed: 12/08/2023 11:18 AM Nicole Rosales MD    I reviewed the above laboratory and imaging data.    Discussion/Summary: 53-year-old woman, with initially a 0.5 mm deep melanoma, found to be 1.1 mm deep on wide formal excision.  No satellitosis, no mitoses, no ulceration.  Would consider sentinel biopsy, though would utilize decision DX testing to determine risk of metastasis.  If low, then surveillance with ultrasound may be an option and second operation may be noted.

## 2024-01-04 ENCOUNTER — LAB REQUISITION (OUTPATIENT)
Dept: LAB | Facility: HOSPITAL | Age: 54
End: 2024-01-04

## 2024-01-04 DIAGNOSIS — C43.59 MALIGNANT MELANOMA OF OTHER PART OF TRUNK (HCC): ICD-10-CM

## 2024-01-11 ENCOUNTER — TELEPHONE (OUTPATIENT)
Dept: SURGICAL ONCOLOGY | Facility: CLINIC | Age: 54
End: 2024-01-11

## 2024-01-11 LAB — SCAN RESULT: NORMAL

## 2024-01-11 NOTE — TELEPHONE ENCOUNTER
LM for patient letting her know [place] results. Teams number given if she has questions. Advised to follow up at her scheduled appt.

## 2024-01-31 ENCOUNTER — OFFICE VISIT (OUTPATIENT)
Dept: SURGICAL ONCOLOGY | Facility: CLINIC | Age: 54
End: 2024-01-31
Payer: COMMERCIAL

## 2024-01-31 VITALS
WEIGHT: 169.4 LBS | OXYGEN SATURATION: 98 % | DIASTOLIC BLOOD PRESSURE: 86 MMHG | TEMPERATURE: 98.3 F | HEIGHT: 62 IN | SYSTOLIC BLOOD PRESSURE: 144 MMHG | BODY MASS INDEX: 31.17 KG/M2 | RESPIRATION RATE: 18 BRPM | HEART RATE: 80 BPM

## 2024-01-31 DIAGNOSIS — C43.59 MALIGNANT MELANOMA OF TORSO EXCLUDING BREAST (HCC): Primary | ICD-10-CM

## 2024-01-31 PROCEDURE — 99212 OFFICE O/P EST SF 10 MIN: CPT | Performed by: SURGERY

## 2024-01-31 NOTE — PROGRESS NOTES
Surgical Oncology Follow Up       [address]  [facility] SURGICAL ONCOLOGY [place]  [address]  [facility] [address]    Chantale Mensah  1970  [phone]  [address]  [facility]GICAL ONCOLOG[facility] [place]  [address]  [facility] [address]    Chief Complaint   Patient presents with    Follow-up     Patient being seen for f/u to discuss [place] results.        Assessment/Plan:    No problem-specific Assessment & Plan notes found for this encounter.       Diagnoses and all orders for this visit:    Malignant melanoma of torso excluding breast (HCC)      Advance Care Planning/Advance Directives:  Discussed disease status, cancer treatment plans and/or cancer treatment goals with the patient.     Oncology History   Malignant melanoma of torso excluding breast (HCC)   9/27/2023 Biopsy    [place] [facility], [id number]  Left lateral upper back-melanoma, 0.35mm, no ulceration, mitosis 1  pT1a     11/7/2023 Biopsy    Consult case ( 3 slides [id number] [facility], collected 9/27/2023)   Skin, left lateral upper back:     MELANOMA (thickness: at least 0.5 mm); multifocally transected  Ulceration: not seen  Mitoses: 0/mm2  Pathologic stage: at least pT1a     12/11/2023 Surgery    Skin, left shoulder, excision:     -Residual MELANOMA (thickness: 1.1 mm); not seen at examined inked specimen margins     1/10/2024 Genomic Testing    Decision Dx- Melanoma  Class 1A         History of Present Illness: Hx of stage 1 melanoma  -Interval History: She is here to discuss her [place] testing results to determine need for sentinel node biopsy.    Review of Systems:  Review of Systems   Constitutional: Negative.    HENT: Negative.     Eyes: Negative.    Respiratory: Negative.     Cardiovascular: Negative.    Gastrointestinal: Negative.    Endocrine: Negative.    Genitourinary: Negative.    Musculoskeletal: Negative.    Skin: Negative.    Allergic/Immunologic:  Negative.    Neurological: Negative.    Hematological: Negative.    Psychiatric/Behavioral: Negative.     All other systems reviewed and are negative.      Patient Active Problem List   Diagnosis    Malignant melanoma of torso excluding breast (HCC)    Neurofibromatosis (HCC)     Past Medical History:   Diagnosis Date    Anemia     Depression     Melanoma (HCC)     left shoulder / left back    Wears dentures     full uppers     Past Surgical History:   Procedure Laterality Date    COLONOSCOPY      EGD      INDUCED       SKIN LESION EXCISION Left 2023    Procedure: WIDE EXCISION LEFT SHOULDER/ BACK MELANOMA;  Surgeon: Shaun Alcaraz MD;  Location: [facility] OR;  Service: Surgical Oncology     No family history on file.  Social History     Socioeconomic History    Marital status: Single     Spouse name: Not on file    Number of children: Not on file    Years of education: Not on file    Highest education level: Not on file   Occupational History    Not on file   Tobacco Use    Smoking status: Former     Current packs/day: 0.00     Types: Cigarettes     Quit date:      Years since quittin.0     Passive exposure: Past    Smokeless tobacco: Never   Substance and Sexual Activity    Alcohol use: Yes     Comment: 2 drinks per day    Drug use: Not Currently    Sexual activity: Not on file   Other Topics Concern    Not on file   Social History Narrative    Not on file     Social Determinants of Health     Financial Resource Strain: Not on file   Food Insecurity: Not on file   Transportation Needs: Not on file   Physical Activity: Not on file   Stress: Not on file   Social Connections: Not on file   Intimate Partner Violence: Not on file   Housing Stability: Not on file       Current Outpatient Medications:     cyanocobalamin (VITAMIN B-12) 1000 MCG tablet, Take 1,000 mcg by mouth daily, Disp: , Rfl:     ferrous sulfate 325 (65 Fe) mg tablet, Take 325 mg by mouth Every other day, Disp: , Rfl:     NON  FORMULARY, Medical marijuana daily, Disp: , Rfl:     traMADol (Ultram) 50 mg tablet, Take 1 tablet (50 mg total) by mouth every 6 (six) hours as needed for moderate pain, Disp: 10 tablet, Rfl: 0    Turmeric 500 MG CAPS, Take by mouth daily, Disp: , Rfl:     Vitamin D, Ergocalciferol, 65305 units CAPS, Take by mouth once a week wednesdays, Disp: , Rfl:   Allergies   Allergen Reactions    Bee Venom Swelling     Vitals:    01/31/24 1411   BP: 144/86   Pulse: 80   Resp: 18   Temp: 98.3 °F (36.8 °C)   SpO2: 98%       Physical Exam  Vitals reviewed.   Constitutional:       General: She is not in acute distress.  Neurological:      General: No focal deficit present.      Mental Status: She is alert and oriented to person, place, and time.   Psychiatric:         Mood and Affect: Mood normal.         Behavior: Behavior normal.           Results:  Labs:  Testing showing class Ia from shoulder melanoma.    Imaging  No results found.  I reviewed the [facility] 6 months for surveillance per guidelines.  e laboratory and imaging data.    Discussion/Summary: Shoulder melanoma post excision, 1.1 mm deep, Class 1A on [place] testing.  Low risk for recurrence and sentinel node positivity.  Will therefore plan on continued surveillance without additional surgical procedures or node biopsy.  Recommend that she follow-up on a regular basis with dermatology team as well given new diagnosis of melanoma.

## 2024-07-25 ENCOUNTER — OFFICE VISIT (OUTPATIENT)
Dept: SURGICAL ONCOLOGY | Facility: CLINIC | Age: 54
End: 2024-07-25
Payer: COMMERCIAL

## 2024-07-25 VITALS
OXYGEN SATURATION: 96 % | TEMPERATURE: 97.4 F | WEIGHT: 167 LBS | HEIGHT: 62 IN | SYSTOLIC BLOOD PRESSURE: 120 MMHG | BODY MASS INDEX: 30.73 KG/M2 | HEART RATE: 83 BPM | DIASTOLIC BLOOD PRESSURE: 82 MMHG

## 2024-07-25 DIAGNOSIS — C43.59 MALIGNANT MELANOMA OF TORSO EXCLUDING BREAST (HCC): Primary | ICD-10-CM

## 2024-07-25 PROCEDURE — 99213 OFFICE O/P EST LOW 20 MIN: CPT

## 2024-07-25 NOTE — PROGRESS NOTES
Surgical Oncology Follow Up       [address]  [facility] SURGICAL ONCOLOGY [place]  [address]  [facility] [address]    Chantale Mensah  1970  [phone]  [address] [facility] SURGICAL ONCOLOGY [place]  [address]  [facility] [address]    Chief Complaint   Patient presents with   • Follow-up       Assessment/Plan:  1. Malignant melanoma of torso excluding breast (HCC)  - 6 mo f/u       Discussion/Summary: Patient is a 53 year old presenting for 6 mo f/u for left back melanoma (pT1a) dx in September 2023. She underwent a wide excision with Dr. Alcaraz. She is a low risk for recurrence and sentinel node positivity so we did not take a sln bx. She is now following with derm regularly. She denies skin changes. There were no concerns on her clinical exam. We have decided to follow her annually as she has a lot of other doctors appts and will be getting full skin checks with derm. I will see the patient back in 1 year or sooner should the need arise. She was instructed to call with any questions or concerns prior to this time. All questions were answered today.       History of Present Illness:     Oncology History   Malignant melanoma of torso excluding breast (HCC)   9/27/2023 Biopsy    [place] for [facility], [id number]  Left lateral upper back-melanoma, 0.35mm, no ulceration, mitosis 1  pT1a     11/7/2023 Biopsy    Consult case ( 3 slides [id number] [facility], collected 9/27/2023)   Skin, left lateral upper back:     MELANOMA (thickness: at least 0.5 mm); multifocally transected  Ulceration: not seen  Mitoses: 0/mm2  Pathologic stage: at least pT1a     12/11/2023 Surgery    Skin, left shoulder, excision:     -Residual MELANOMA (thickness: 1.1 mm); not seen at examined inked specimen margins     12/11/2023 -  Cancer Staged    Staging form: Melanoma of the Skin, AJCC 8th Edition  - Pathologic stage from 12/11/2023: Stage Unknown (pT2a,  pNX, cM0) - Signed by ASHVIN Osorio on 2024  Stage prefix: Initial diagnosis       1/10/2024 Genomic Testing    Decision Dx- Melanoma  Class 1A          -Interval History:Patient is a 53 year old presenting for 6 mo f/u for left back melanoma (pT1a) dx in 2023. She denies skin changes. She denies persistent headaches, bone pain, back pain, shortness of breath, or abdominal pain.      Review of Systems:  Review of Systems   Constitutional:  Negative for activity change, appetite change, fatigue and unexpected weight change.   Respiratory:  Negative for cough and shortness of breath.    Cardiovascular:  Negative for chest pain.   Gastrointestinal:  Negative for abdominal pain, diarrhea, nausea and vomiting.   Endocrine: Negative for heat intolerance.   Musculoskeletal:  Negative for arthralgias, back pain and myalgias.   Skin:  Negative for rash.   Neurological:  Negative for weakness and headaches.   Hematological:  Negative for adenopathy.       Patient Active Problem List   Diagnosis   • Malignant melanoma of torso excluding breast (HCC)   • Neurofibromatosis (HCC)     Past Medical History:   Diagnosis Date   • Anemia    • Depression    • Melanoma (HCC)     left shoulder / left back   • Wears dentures     full uppers     Past Surgical History:   Procedure Laterality Date   • COLONOSCOPY     • EGD     • INDUCED      • SKIN LESION EXCISION Left 2023    Procedure: WIDE EXCISION LEFT SHOULDER/ BACK MELANOMA;  Surgeon: Shaun Alcaraz MD;  Location: [facility];  Service: Surgical Oncology     No family history on file.  Social History     Socioeconomic History   • Marital status: Single     Spouse name: Not on file   • Number of children: Not on file   • Years of education: Not on file   • Highest education level: Not on file   Occupational History   • Not on file   Tobacco Use   • Smoking status: Former     Current packs/day: 0.00     Types: Cigarettes     Quit date:       Years since quittin.5     Passive exposure: Past   • Smokeless tobacco: Never   Substance and Sexual Activity   • Alcohol use: Yes     Comment: 2 drinks per day   • Drug use: Not Currently   • Sexual activity: Not on file   Other Topics Concern   • Not on file   Social History Narrative   • Not on file     Social Determinants of Health     Financial Resource Strain: Not on file   Food Insecurity: Not on file   Transportation Needs: Not on file   Physical Activity: Not on file   Stress: Not on file   Social Connections: Not on file   Intimate Partner Violence: Not on file   Housing Stability: Not on file       Current Outpatient Medications:   •  cyanocobalamin (VITAMIN B-12) 1000 MCG tablet, Take 1,000 mcg by mouth daily, Disp: , Rfl:   •  ferrous sulfate 325 (65 Fe) mg tablet, Take 325 mg by mouth Every other day, Disp: , Rfl:   •  NON FORMULARY, Medical marijuana daily, Disp: , Rfl:   •  Turmeric 500 MG CAPS, Take by mouth daily, Disp: , Rfl:   •  Vitamin D, Ergocalciferol, 27471 units CAPS, Take by mouth once a week , Disp: , Rfl:   •  traMADol (Ultram) 50 mg tablet, Take 1 tablet (50 mg total) by mouth every 6 (six) hours as needed for moderate pain, Disp: 10 tablet, Rfl: 0  Allergies   Allergen Reactions   • Bee Venom Swelling     Vitals:    24 1400   BP: 120/82   Pulse: 83   Temp: (!) 97.4 °F (36.3 °C)   SpO2: 96%       Physical Exam  Constitutional:       General: She is not in acute distress.     Appearance: Normal appearance.   Cardiovascular:      Rate and Rhythm: Normal rate and regular rhythm.      Pulses: Normal pulses.      Heart sounds: Normal heart sounds.   Pulmonary:      Effort: Pulmonary effort is normal.      Breath sounds: Normal breath sounds.   Chest:      Chest wall: No mass.   Breasts:     Right: No swelling, bleeding, inverted nipple, mass, nipple discharge, skin change or tenderness.      Left: No swelling, bleeding, inverted nipple, mass, nipple discharge, skin  change or tenderness.   Abdominal:      General: Abdomen is flat.      Palpations: Abdomen is soft.   Lymphadenopathy:      Cervical:      Left cervical: No superficial cervical adenopathy.      Upper Body:      Right upper body: No supraclavicular, axillary or pectoral adenopathy.      Left upper body: No supraclavicular, axillary or pectoral adenopathy.   Skin:     General: Skin is warm.      Findings: No lesion or rash.          Neurological:      General: No focal deficit present.      Mental Status: She is alert and oriented to person, place, and time.   Psychiatric:         Mood and Affect: Mood normal.         Behavior: Behavior normal.           Results:    Imaging  No results found.    I reviewed the above imaging data.      Advance Care Planning/Advance Directives:  Discussed disease status, cancer treatment plans and/or cancer treatment goals with the patient.

## 2025-08-01 ENCOUNTER — OFFICE VISIT (OUTPATIENT)
Dept: SURGICAL ONCOLOGY | Facility: CLINIC | Age: 55
End: 2025-08-01
Payer: COMMERCIAL

## 2025-08-01 VITALS
SYSTOLIC BLOOD PRESSURE: 132 MMHG | OXYGEN SATURATION: 99 % | BODY MASS INDEX: 30.54 KG/M2 | RESPIRATION RATE: 18 BRPM | HEIGHT: 62 IN | DIASTOLIC BLOOD PRESSURE: 74 MMHG | TEMPERATURE: 97.8 F | HEART RATE: 71 BPM

## 2025-08-01 DIAGNOSIS — Z08 ENCOUNTER FOR FOLLOW-UP EXAMINATION AFTER COMPLETED TREATMENT FOR MALIGNANT NEOPLASM: Primary | ICD-10-CM

## 2025-08-01 DIAGNOSIS — C43.59 MALIGNANT MELANOMA OF TORSO EXCLUDING BREAST (HCC): ICD-10-CM

## 2025-08-01 PROCEDURE — 99212 OFFICE O/P EST SF 10 MIN: CPT

## (undated) DEVICE — GLOVE INDICATOR PI UNDERGLOVE SZ 7 BLUE

## (undated) DEVICE — 3M™ STERI-STRIP™ COMPOUND BENZOIN TINCTURE 40 BAGS/CARTON 4 CARTONS/CASE C1544: Brand: 3M™ STERI-STRIP™

## (undated) DEVICE — SUT VICRYL 3-0 SH 27 IN J416H

## (undated) DEVICE — SCD SEQUENTIAL COMPRESSION COMFORT SLEEVE MEDIUM KNEE LENGTH: Brand: KENDALL SCD

## (undated) DEVICE — LIGACLIP MCA MULTIPLE CLIP APPLIERS, 20 MEDIUM CLIPS: Brand: LIGACLIP

## (undated) DEVICE — GLOVE SRG BIOGEL ECLIPSE 7

## (undated) DEVICE — SINGLE PORT MANIFOLD: Brand: NEPTUNE 2

## (undated) DEVICE — SUT SILK 3-0 SH 30 IN K832H

## (undated) DEVICE — DRAPE EQUIPMENT RF WAND

## (undated) DEVICE — NEEDLE 25G X 1 1/2

## (undated) DEVICE — 10FR FRAZIER SUCTION HANDLE: Brand: CARDINAL HEALTH

## (undated) DEVICE — BETHLEHEM UNIVERSAL MINOR GEN: Brand: CARDINAL HEALTH

## (undated) DEVICE — CHLORAPREP HI-LITE 26ML ORANGE

## (undated) DEVICE — SUT MONOCRYL 4-0 PS-2 27 IN Y426H

## (undated) DEVICE — ELECTRODE NEEDLE E-Z CLEAN 6IN -0016

## (undated) DEVICE — INTENDED FOR TISSUE SEPARATION, AND OTHER PROCEDURES THAT REQUIRE A SHARP SURGICAL BLADE TO PUNCTURE OR CUT.: Brand: BARD-PARKER SAFETY BLADES SIZE 15, STERILE

## (undated) DEVICE — TUBING SUCTION 5MM X 12 FT